# Patient Record
(demographics unavailable — no encounter records)

---

## 2025-06-18 NOTE — CONSULT LETTER
[FreeTextEntry1] : Dear MY VALDEZ ,   I had the pleasure of seeing your patient, HARPREET PONCE, in my office today.  Please see my note below.  Thank you very much for allowing me to participate in the care of this patient. If you have any questions, please do not hesitate to contact me.  Sincerely,   Treasure Holcomb Md EdM  , Pediatric Nephrology  [facility]

## 2025-06-18 NOTE — REVIEW OF SYSTEMS
[Eyesight Problems] : eyesight problems [Negative] : Gastrointestinal [Immunizations are up to date as per historian] : Immunizations are up to date as per historian [Gross Hematuria] : no gross hematuria [Edema] : no edema [FreeTextEntry3] : uses eyeglasses

## 2025-06-18 NOTE — ASSESSMENT
[FreeTextEntry1] : Given the urinalysis from the pediatrician's office and today, the patient has proteinuria. Of note, patient's urine today showed ketones which may be suggestive of dehydration. Cannot r/o orthostatic proteinuria as the patient's urine from today was collected this afternoon. Recommended patient to bring AM urine to the laboratory to r/o orthostatic proteinuria. Encouraged increasing water to intake to ensure adequate hydration.

## 2025-06-18 NOTE — REASON FOR VISIT
[Initial Evaluation] : an initial evaluation of [Mother] : mother [Proteinuria] : proteinuria [Patient] : patient